# Patient Record
Sex: MALE | Race: WHITE | Employment: UNEMPLOYED | URBAN - METROPOLITAN AREA
[De-identification: names, ages, dates, MRNs, and addresses within clinical notes are randomized per-mention and may not be internally consistent; named-entity substitution may affect disease eponyms.]

---

## 2018-04-29 ENCOUNTER — APPOINTMENT (OUTPATIENT)
Dept: GENERAL RADIOLOGY | Age: 83
End: 2018-04-29
Attending: EMERGENCY MEDICINE
Payer: MEDICARE

## 2018-04-29 ENCOUNTER — APPOINTMENT (OUTPATIENT)
Dept: CT IMAGING | Age: 83
End: 2018-04-29
Attending: INTERNAL MEDICINE
Payer: MEDICARE

## 2018-04-29 ENCOUNTER — HOSPITAL ENCOUNTER (EMERGENCY)
Age: 83
Discharge: HOME | End: 2018-04-29
Attending: EMERGENCY MEDICINE | Admitting: INTERNAL MEDICINE
Payer: MEDICARE

## 2018-04-29 VITALS
SYSTOLIC BLOOD PRESSURE: 149 MMHG | HEIGHT: 62 IN | BODY MASS INDEX: 23 KG/M2 | RESPIRATION RATE: 15 BRPM | WEIGHT: 125 LBS | OXYGEN SATURATION: 100 % | TEMPERATURE: 98.1 F | DIASTOLIC BLOOD PRESSURE: 72 MMHG | HEART RATE: 61 BPM

## 2018-04-29 DIAGNOSIS — T18.108A FOREIGN BODY IN ESOPHAGUS, INITIAL ENCOUNTER: Primary | ICD-10-CM

## 2018-04-29 DIAGNOSIS — C15.5 MALIGNANT NEOPLASM OF LOWER THIRD OF ESOPHAGUS (HCC): ICD-10-CM

## 2018-04-29 LAB
ANION GAP SERPL CALC-SCNC: 7 MMOL/L (ref 3–18)
APTT PPP: 26.9 SEC (ref 23–36.4)
ATRIAL RATE: 62 BPM
BASOPHILS # BLD: 0 K/UL (ref 0–0.06)
BASOPHILS NFR BLD: 0 % (ref 0–2)
BUN SERPL-MCNC: 29 MG/DL (ref 7–18)
BUN/CREAT SERPL: 31 (ref 12–20)
CALCIUM SERPL-MCNC: 8.8 MG/DL (ref 8.5–10.1)
CALCULATED R AXIS, ECG10: 73 DEGREES
CALCULATED T AXIS, ECG11: 73 DEGREES
CHLORIDE SERPL-SCNC: 103 MMOL/L (ref 100–108)
CO2 SERPL-SCNC: 31 MMOL/L (ref 21–32)
CREAT SERPL-MCNC: 0.95 MG/DL (ref 0.6–1.3)
DIAGNOSIS, 93000: NORMAL
DIFFERENTIAL METHOD BLD: ABNORMAL
EOSINOPHIL # BLD: 0.3 K/UL (ref 0–0.4)
EOSINOPHIL NFR BLD: 3 % (ref 0–5)
ERYTHROCYTE [DISTWIDTH] IN BLOOD BY AUTOMATED COUNT: 14.2 % (ref 11.6–14.5)
GLUCOSE BLD STRIP.AUTO-MCNC: 101 MG/DL (ref 70–110)
GLUCOSE SERPL-MCNC: 105 MG/DL (ref 74–99)
HCT VFR BLD AUTO: 40.4 % (ref 36–48)
HGB BLD-MCNC: 13.4 G/DL (ref 13–16)
INR PPP: 1 (ref 0.8–1.2)
LYMPHOCYTES # BLD: 1.4 K/UL (ref 0.9–3.6)
LYMPHOCYTES NFR BLD: 15 % (ref 21–52)
MCH RBC QN AUTO: 31.5 PG (ref 24–34)
MCHC RBC AUTO-ENTMCNC: 33.2 G/DL (ref 31–37)
MCV RBC AUTO: 95.1 FL (ref 74–97)
MONOCYTES # BLD: 1 K/UL (ref 0.05–1.2)
MONOCYTES NFR BLD: 10 % (ref 3–10)
NEUTS SEG # BLD: 7 K/UL (ref 1.8–8)
NEUTS SEG NFR BLD: 72 % (ref 40–73)
PLATELET # BLD AUTO: 250 K/UL (ref 135–420)
PMV BLD AUTO: 9.1 FL (ref 9.2–11.8)
POTASSIUM SERPL-SCNC: 4.2 MMOL/L (ref 3.5–5.5)
PROTHROMBIN TIME: 12.8 SEC (ref 11.5–15.2)
Q-T INTERVAL, ECG07: 418 MS
QRS DURATION, ECG06: 108 MS
QTC CALCULATION (BEZET), ECG08: 424 MS
RBC # BLD AUTO: 4.25 M/UL (ref 4.7–5.5)
SODIUM SERPL-SCNC: 141 MMOL/L (ref 136–145)
VENTRICULAR RATE, ECG03: 62 BPM
WBC # BLD AUTO: 9.7 K/UL (ref 4.6–13.2)

## 2018-04-29 PROCEDURE — 71275 CT ANGIOGRAPHY CHEST: CPT

## 2018-04-29 PROCEDURE — 74011250636 HC RX REV CODE- 250/636

## 2018-04-29 PROCEDURE — 85730 THROMBOPLASTIN TIME PARTIAL: CPT | Performed by: EMERGENCY MEDICINE

## 2018-04-29 PROCEDURE — 77030011640 HC PAD GRND REM COVD -A: Performed by: INTERNAL MEDICINE

## 2018-04-29 PROCEDURE — 74011000250 HC RX REV CODE- 250: Performed by: INTERNAL MEDICINE

## 2018-04-29 PROCEDURE — 85610 PROTHROMBIN TIME: CPT | Performed by: EMERGENCY MEDICINE

## 2018-04-29 PROCEDURE — 99283 EMERGENCY DEPT VISIT LOW MDM: CPT

## 2018-04-29 PROCEDURE — 74011250637 HC RX REV CODE- 250/637

## 2018-04-29 PROCEDURE — 77030007289 HC DEV ROTH NET RETRV STRC -B: Performed by: INTERNAL MEDICINE

## 2018-04-29 PROCEDURE — 77030009426 HC FCPS BIOP ENDOSC BSC -B: Performed by: INTERNAL MEDICINE

## 2018-04-29 PROCEDURE — 71045 X-RAY EXAM CHEST 1 VIEW: CPT

## 2018-04-29 PROCEDURE — G0500 MOD SEDAT ENDO SERVICE >5YRS: HCPCS | Performed by: INTERNAL MEDICINE

## 2018-04-29 PROCEDURE — 76040000003: Performed by: INTERNAL MEDICINE

## 2018-04-29 PROCEDURE — 80048 BASIC METABOLIC PNL TOTAL CA: CPT | Performed by: EMERGENCY MEDICINE

## 2018-04-29 PROCEDURE — 99153 MOD SED SAME PHYS/QHP EA: CPT | Performed by: INTERNAL MEDICINE

## 2018-04-29 PROCEDURE — 96374 THER/PROPH/DIAG INJ IV PUSH: CPT

## 2018-04-29 PROCEDURE — 74011636320 HC RX REV CODE- 636/320: Performed by: EMERGENCY MEDICINE

## 2018-04-29 PROCEDURE — 88305 TISSUE EXAM BY PATHOLOGIST: CPT | Performed by: INTERNAL MEDICINE

## 2018-04-29 PROCEDURE — 85025 COMPLETE CBC W/AUTO DIFF WBC: CPT | Performed by: EMERGENCY MEDICINE

## 2018-04-29 PROCEDURE — 82962 GLUCOSE BLOOD TEST: CPT

## 2018-04-29 PROCEDURE — 93005 ELECTROCARDIOGRAM TRACING: CPT

## 2018-04-29 PROCEDURE — 74011250636 HC RX REV CODE- 250/636: Performed by: INTERNAL MEDICINE

## 2018-04-29 RX ORDER — NALOXONE HYDROCHLORIDE 0.4 MG/ML
0.4 INJECTION, SOLUTION INTRAMUSCULAR; INTRAVENOUS; SUBCUTANEOUS
Status: DISCONTINUED | OUTPATIENT
Start: 2018-04-29 | End: 2018-04-29

## 2018-04-29 RX ORDER — DEXTROMETHORPHAN/PSEUDOEPHED 2.5-7.5/.8
1.2 DROPS ORAL
Status: CANCELLED | OUTPATIENT
Start: 2018-04-29

## 2018-04-29 RX ORDER — FLUMAZENIL 0.1 MG/ML
0.2 INJECTION INTRAVENOUS
Status: CANCELLED | OUTPATIENT
Start: 2018-04-29 | End: 2018-04-29

## 2018-04-29 RX ORDER — MIDAZOLAM HYDROCHLORIDE 1 MG/ML
.5-5 INJECTION, SOLUTION INTRAMUSCULAR; INTRAVENOUS
Status: DISCONTINUED | OUTPATIENT
Start: 2018-04-29 | End: 2018-04-29

## 2018-04-29 RX ORDER — NITROGLYCERIN 0.4 MG/1
TABLET SUBLINGUAL
Status: COMPLETED
Start: 2018-04-29 | End: 2018-04-29

## 2018-04-29 RX ORDER — FLUMAZENIL 0.1 MG/ML
0.2 INJECTION INTRAVENOUS
Status: DISCONTINUED | OUTPATIENT
Start: 2018-04-29 | End: 2018-04-29

## 2018-04-29 RX ORDER — SODIUM CHLORIDE 9 MG/ML
100 INJECTION, SOLUTION INTRAVENOUS CONTINUOUS
Status: CANCELLED | OUTPATIENT
Start: 2018-04-29 | End: 2018-04-29

## 2018-04-29 RX ORDER — NALOXONE HYDROCHLORIDE 0.4 MG/ML
0.4 INJECTION, SOLUTION INTRAMUSCULAR; INTRAVENOUS; SUBCUTANEOUS
Status: CANCELLED | OUTPATIENT
Start: 2018-04-29 | End: 2018-04-29

## 2018-04-29 RX ORDER — EPINEPHRINE 0.1 MG/ML
1 INJECTION INTRACARDIAC; INTRAVENOUS
Status: CANCELLED | OUTPATIENT
Start: 2018-04-29 | End: 2018-04-29

## 2018-04-29 RX ORDER — FENTANYL CITRATE 50 UG/ML
100 INJECTION, SOLUTION INTRAMUSCULAR; INTRAVENOUS
Status: CANCELLED | OUTPATIENT
Start: 2018-04-29 | End: 2018-04-29

## 2018-04-29 RX ORDER — ATROPINE SULFATE 0.1 MG/ML
0.5 INJECTION INTRAVENOUS
Status: CANCELLED | OUTPATIENT
Start: 2018-04-29 | End: 2018-04-29

## 2018-04-29 RX ORDER — EPINEPHRINE 0.1 MG/ML
1 INJECTION INTRACARDIAC; INTRAVENOUS
Status: DISCONTINUED | OUTPATIENT
Start: 2018-04-29 | End: 2018-04-29

## 2018-04-29 RX ORDER — NITROGLYCERIN 0.4 MG/1
0.4 TABLET SUBLINGUAL
Status: COMPLETED | OUTPATIENT
Start: 2018-04-29 | End: 2018-04-29

## 2018-04-29 RX ORDER — FENTANYL CITRATE 50 UG/ML
100 INJECTION, SOLUTION INTRAMUSCULAR; INTRAVENOUS
Status: DISCONTINUED | OUTPATIENT
Start: 2018-04-29 | End: 2018-04-29

## 2018-04-29 RX ORDER — MIDAZOLAM HYDROCHLORIDE 1 MG/ML
.5-5 INJECTION, SOLUTION INTRAMUSCULAR; INTRAVENOUS
Status: CANCELLED | OUTPATIENT
Start: 2018-04-29 | End: 2018-04-29

## 2018-04-29 RX ADMIN — NITROGLYCERIN: 0.4 TABLET SUBLINGUAL at 10:35

## 2018-04-29 RX ADMIN — IOPAMIDOL 100 ML: 755 INJECTION, SOLUTION INTRAVENOUS at 15:36

## 2018-04-29 RX ADMIN — GLUCAGON HYDROCHLORIDE: KIT at 10:35

## 2018-04-29 NOTE — CONSULTS
[id number] [facility]    Ian Marte  MR#: [id number]  : 1930  ACCOUNT #: [de-identified]   DATE OF SERVICE: 2018    HISTORY OF PRESENT ILLNESS:  This is an 68-year-old male who was brought by his family around 10:24 this morning because of acute food impaction that happened during breakfast this morning. He was eating [place] toast and 2 bradley. It happened, in fact, with almost the second bite. Yesterday, he ate hamburger and it went fine. Apparently, the patient has been having problem with dysphagia for the past couple of years but he never had food blockage like this one. He claimed that he had an endoscopy many years ago. He does not remember why and what was found. He denied having any heartburn presently or in the past.  He does not take medication for his stomach. His weight has been stable. He was not afraid to eat. He has been chronically constipated, but he managed to get a bowel movement every day with the help of MiraLax that he was taking as needed. His past medical history is remarkable for mild dementia. He has 2 previous aortic valve replacements that happened about 15 years ago. He had a pacemaker about 7 years ago. Last year, he had a syncopal episode and fell from the roof and a second syncopal episode happened this year in 2018 where apparently he tripped on something on the floor but we are not sure about the circumstances. This patient had hip surgery but no abdominal surgery. This patient has been living by himself in [place]. He was supposed to see his cardiologist 3 weeks ago and that appointment was canceled because of the weather issue. FAMILY HISTORY:  Mother  from complication of a fall and father had Crohn disease but there is no history of cancer in the family. ALLERGIES:  HE IS ALLERGIC TO LATEX. MEDICATIONS AT HOME:  Eliquis, pravastatin, lisinopril, terazosin.     FUNCTIONAL INQUIRY:  He denies having chest pain, shortness of breath, dizziness, lightheadedness, recent falling, just had the one in February. No history of stroke, paralysis, numbness. No fever, chills, shivers. His weight has been stable. According to the family, the patient has been having problem with memories and he has mild dementia but he is quite functional and active at home. PHYSICAL EXAMINATION:  GENERAL:  We have an 26-year-old  male who appears to be much younger than his true age and in no distress. He weighs 125 pounds and measures 5 feet 2 inches. VITAL SIGNS:  Temperature is 98.1, pulse 61, blood pressure 157/69, breathing 22, saturation 100% on room air. SKIN:  Normal.  No evidence of any rash, no ecchymosis. HEENT:  Eyes are unremarkable. The pupils are equal and reactive to light. Sclerae are anicteric. The conjunctivae are pink. Oropharyngeal cavity is normal with moist and pink mucous membrane, normal teeth. NECK:  Supple, no palpable mass, no enlarged thyroid. He has a midsternal surgical scar and a pacemaker in the left upper chest.  LUNGS:  Clear to auscultation. HEART:  Cardiac rhythm is regular. He is in paced rhythm. The S1 and S2 are normal and there is no murmur. ABDOMEN:  Nondistended, very soft, nontender, no mass or organomegaly. Bowel sounds are normal.  EXTREMITIES:  Unremarkable. No pedal edema, no clubbing, no tremor, no focal neurological sign. He is alert and oriented. Moves all his 4 extremities. SOCIAL HISTORY:  I forgot to mention that this patient used to smoke many years ago cigars. He has been drinking 2-4 alcoholic drinks daily, but not recently. LABORATORY DATA:  CBC normal with hemoglobin of 13.4, normal platelets, WBC differential, normal coagulation. Basic metabolic panel is normal with a BUN of 29, creatinine 0.935. IMAGING:  Chest x-ray status post CABG and presence of a pacemaker. Lungs are clear. Mild cardiomegaly.     CONCLUSION:  In conclusion, this is an 80-year-old male who has been having problem with progressive intermittent dysphagia to solids for the past couple of years, presents with acute food bolus impaction for the first time. This started early in the morning at breakfast and he has been unable to clear it, even after giving him glucagon. He is still spitting saliva, but not choking. 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 At this age, it could be malignant. We need to check his esophagus to remove the foreign body and then inspect and dilate his esophagus if needed. We may have to limit the amount of biopsies in case he has some malignant pathology there, just because he is on Eliquis. He may require repeat procedure. This patient has a previous aortic valve replacement with a bovine valve about 15 years ago. He appears to be doing very well. He has also a pacemaker about 7 years ago, still functioning. He appears to be completely dependent of the pace. I told him that he needs to check with his cardiologist, especially that he had a fall a year ago in February 2018. He appears to have mild dementia. He is living by himself and his family is planning on bringing him with them in this area. Further note to follow. SHENA Reina MD MBE / Jody Jose  D: 04/29/2018 12:47     T: 04/29/2018 14:36  JOB #: [id number]  CC: Lashanda Tijerina MD

## 2018-04-29 NOTE — ED TRIAGE NOTES
States he feels like he has a stricture in his esophagus. Family member states pt ate [place] toast and bradley this morning when sx's began. Pt noted to be spitting into bag on arrival to ED. Family member states pt recently came to stay with them--no longer to stay by himself due to dementia--and has had episodes of coughing and feeling like something is in his throat after eating. Sepsis Screening completed    (  )Patient meets SIRS criteria. (x  )Patient does not meet SIRS criteria.       SIRS Criteria is achieved when two or more of the following are present   Temperature < 96.8°F (36°C) or > 100.9°F (38.3°C)   Heart Rate > 90 beats per minute   Respiratory Rate > 20 breaths per minute   WBC count > 12,000 or <4,000 or > 10% bands

## 2018-04-29 NOTE — PROCEDURES
[facility]  Esophagogastroduodenoscopy Procedure Report  _______________________________________________________  Patient: Nithya Trevino                                         Attending Physician: Mario Ramirez MD    Patient ID: [id number]                                      Referring Physician: PROVIDER UNKNOWN    Exam Date: April 29, 2018 _______________________________________________________      Introduction: A  80 y.o. male patient, presents for Esophagogastroduodenoscopy Procedure. Indication: First episode of acute food impaction. Has been c/o intermittent dysphagia to solids for 2 years. No weight loss or GERD. He is on Eliquis for biologic aortic valve replacement 15 years. : Mario Ramirez MD    Sedation:    Versed 10 mg IV, fentanyl 100 mcg IV, topical Hurricaine spray, glucagon 1 mg IV. Procedure Details:  After infomed consent was obtained for the procedure, with all risks and benefits of procedure explained the patient was taken to the endoscopy suite and placed in the left lateral decubitus position. Following sequential administration of sedation as per above, the endoscope was inserted into the mouth and advanced under direct vision to second portion of the duodenum. A careful inspection was made as the gastroscope was withdrawn, including a retroflexed view of the proximal stomach; findings and interventions are described below. Findings:   HYPOPHARYNX AND LARYNX: Normal  Esophagus: Esophagus spastic tortuous full of food going all the way to the proximal esophagus. It was removed in piece meal fashion with Olivia Jesus net in multiple trips to the out side. There is a 8 cm long mid esophageal infiltrative, stenotic  and friable cancer between 26 and 34 cm occupying 80 % of the circumference. Biopsies taken. The Z line is located at 38 cm there is < 2 cm hiatal hernia. Normal proximal and distal esophagus. Diaphragmatic pinch located at 40 cm.     Stomach: No food or liquid retention. Normal, cardia, fundus, body, lesser curvature, incisura, antrum and pylorus. Mild diffuse gastritis. .    Duodenum/jejunum: The bulb, second portions and major papilla are normal     Therapies:    biopsy of esophagus  removal of foriegn body food particles impacted in the esophagus using rosenberg net in at least 10 trips    Specimen:   One           Complications:   None    EBL:  Minimal  IMPRESSION:  Esophagus spastic tortuous full of food going all the way to the proximal esophagus. It was removed with Brittany Neighbor net in multiple trips to the out side. There is a 8 cm long mid esophageal infiltrative, stenotic  and friable cancer between 26 and 34 cm occupying 80 % of the circumference. Biopsies taken. The Z line is located at 38 cm there is < 2 cm hiatal hernia. Mild diffuse gastritis. Recommendations: -Await pathology. , -soft to liquid diet, - would need consult with oncology and radiooncology. Do a CT scan of the chest and abdomen for staging.   Assistant: none    Caron Thompson MD  4/29/2018  12:52 PM

## 2018-04-29 NOTE — PERIOP NOTES
PT S/P EGD. VSS. PT RECEIVED 10 VERSED  FENTANYL UNDER CONSCIOUS SEDATION. PT TOLERATED PROCEDURE WELL. CALLED REPORT TO CHARGE RN AND DID BEDSIDE REPORT WITH STAFF RN. REPORTED FINDINGS, HIATAL HERNIA, LARGE MID ESOPHOGEAL CANCEROUS TUMOR FOUND , FOOD IMPACTION REMOVAL COMPLETED AS WELL. FAMILY AT BEDSIDE AND SPOKEN TO BY PHYSICIAN AND FINDINGS DISCUSSED. CT ORDERED AND ER WILL COMPLETE.

## 2018-04-29 NOTE — ED PROVIDER NOTES
EMERGENCY DEPARTMENT HISTORY AND PHYSICAL EXAM    Date: 4/29/2018  Patient Name: Christopher Lua    History of Presenting Illness     Chief Complaint   Patient presents with    Other         History Provided By: Patient    Chief Complaint: sensation of foreign body stuck in throat  Duration: this morning   Timing:  Acute  Location: \"esophagus down to my stomach\"  Quality: \"It feels like a stricture\"  Severity: 5 out of 10  Modifying Factors: Pt spitting in a bag as he is unable to swallow (trying to swallow worsens it). Associated Symptoms: difficulty swallowing, mild chest pain, mild SOB    Additional History (Context):   10:20 AM  Christopher Lua is a 80 y.o. male with PMHX of HLD, HTN, dementia, and Atrial flutter who presents to the emergency department C/O sensation of foreign body stuck in throat \"esophagus down to my stomach; it feels like a stricture\" (points from throat to mid chest) after eating bradley and [place] toast this morning. Pt spitting in a back as he is unable to swallow. Associated sxs include difficulty swallowing, mild CP, and mild SOB. Allergy reported to Latex. Pt denies hx of this occurring in the past or GERD. PSHx of pacemaker placement and valve placement. Pt denies any other sxs or complaints. PCP: PROVIDER UNKNOWN        Past History     Past Medical History:  Past Medical History:   Diagnosis Date    Atrial flutter ([address]     Dementia     Fall     Hypertension     Risk for falls        Past Surgical History:  Past Surgical History:   Procedure Laterality Date    CARDIAC SURG PROCEDURE UNLIST      valve replacement    HX PACEMAKER         Family History:  No family history on file. Social History:  Social History   Substance Use Topics    Smoking status: Not on file    Smokeless tobacco: Not on file    Alcohol use Not on file       Allergies: Allergies   Allergen Reactions    Latex Rash         Review of Systems   Review of Systems   HENT: Positive for trouble swallowing. (+) Foreign body stuck in throat sensation   Respiratory: Positive for shortness of breath (mild). Cardiovascular: Positive for chest pain (mild). All other systems reviewed and are negative. Physical Exam     Vitals:    04/29/18 1410 04/29/18 1415 04/29/18 1500 04/29/18 1700   BP: 105/57 120/60 122/57 149/72   Pulse: 86 64 60 61   Resp: 19 18 15 15   Temp:       SpO2: 98% 94% 100% 100%   Weight:       Height:         Physical Exam   Nursing note and vitals reviewed. Vital signs and nursing notes reviewed    CONSTITUTIONAL: Alert, not tolerating oral secretions. Coughing up spit and gurgly. ; well-developed; well-nourished. HEAD:  Normocephalic, atraumatic  EYES: PERRL; EOM's intact. ENTM: Nose: no rhinorrhea; Throat: no erythema or exudate, mucous membranes moist  Neck:  No JVD, supple without lymphadenopathy  RESP: Chest clear but with respiratory gurgles transmitting sound, equal breath sounds. CV: S1 and S2 WNL; No murmurs, gallops or rubs. GI: Normal bowel sounds, abdomen soft and non-tender. No masses or organomegaly. : No costo-vertebral angle tenderness. BACK:  Non-tender  UPPER EXT:  Normal inspection  LOWER EXT: No edema, no calf tenderness. Distal pulses intact. NEURO: CN intact, reflexes 2/4 and sym, strength 5/5 and sym, sensation intact. SKIN: No rashes; Normal for age and stage. PSYCH:  Alert and oriented, normal affect.          Diagnostic Study Results     Labs -     Recent Results (from the past 12 hour(s))   CBC WITH AUTOMATED DIFF    Collection Time: 04/29/18 10:30 AM   Result Value Ref Range    WBC 9.7 4.6 - 13.2 K/uL    RBC 4.25 (L) 4.70 - 5.50 M/uL    HGB 13.4 13.0 - 16.0 g/dL    HCT 40.4 36.0 - 48.0 %    MCV 95.1 74.0 - 97.0 FL    MCH 31.5 24.0 - 34.0 PG    MCHC 33.2 31.0 - 37.0 g/dL    RDW 14.2 11.6 - 14.5 %    PLATELET 255 899 - 482 K/uL    MPV 9.1 (L) 9.2 - 11.8 FL    NEUTROPHILS 72 40 - 73 %    LYMPHOCYTES 15 (L) 21 - 52 %    MONOCYTES 10 3 - 10 % EOSINOPHILS 3 0 - 5 %    BASOPHILS 0 0 - 2 %    ABS. NEUTROPHILS 7.0 1.8 - 8.0 K/UL    ABS. LYMPHOCYTES 1.4 0.9 - 3.6 K/UL    ABS. MONOCYTES 1.0 0.05 - 1.2 K/UL    ABS. EOSINOPHILS 0.3 0.0 - 0.4 K/UL    ABS. BASOPHILS 0.0 0.0 - 0.06 K/UL    DF AUTOMATED     METABOLIC PANEL, BASIC    Collection Time: 04/29/18 10:30 AM   Result Value Ref Range    Sodium 141 136 - 145 mmol/L    Potassium 4.2 3.5 - 5.5 mmol/L    Chloride 103 100 - 108 mmol/L    CO2 31 21 - 32 mmol/L    Anion gap 7 3.0 - 18 mmol/L    Glucose 105 (H) 74 - 99 mg/dL    BUN 29 (H) 7.0 - 18 MG/DL    Creatinine 0.95 0.6 - 1.3 MG/DL    BUN/Creatinine ratio 31 (H) 12 - 20      GFR est AA >60 >60 ml/min/1.73m2    GFR est non-AA >60 >60 ml/min/1.73m2    Calcium 8.8 8.5 - 10.1 MG/DL   PROTHROMBIN TIME + INR    Collection Time: 04/29/18 10:30 AM   Result Value Ref Range    Prothrombin time 12.8 11.5 - 15.2 sec    INR 1.0 0.8 - 1.2     PTT    Collection Time: 04/29/18 10:30 AM   Result Value Ref Range    aPTT 26.9 23.0 - 36.4 SEC   GLUCOSE, POC    Collection Time: 04/29/18 10:31 AM   Result Value Ref Range    Glucose (POC) 101 70 - 110 mg/dL   EKG, 12 LEAD, INITIAL    Collection Time: 04/29/18 10:32 AM   Result Value Ref Range    Ventricular Rate 62 BPM    Atrial Rate 62 BPM    QRS Duration 108 ms    Q-T Interval 418 ms    QTC Calculation (Bezet) 424 ms    Calculated R Axis 73 degrees    Calculated T Axis 73 degrees    Diagnosis       Junctional rhythm  Abnormal ECG  No previous ECGs available         Radiologic Studies -     CT Results  (Last 48 hours)               04/29/18 1548  CTA CHEST ABD PELV W CONT Final result    Impression:  IMPRESSION:           1. Atherosclerosis of the aorta and its branches, including the coronary   arteries, without evidence of dissection. 2. Ectasia of the infrarenal abdominal aorta measuring 2.6 cm in AP diameter. 3. Esophageal thickening corresponding to the known mass.  There are enlarged   upper mediastinal and upper abdominal lymph nodes, most likely representing   sites of metastatic lymphadenopathy. 4. Additional chronic and incidental findings as above. Narrative:   CTA OF THE CHEST, ABDOMEN, AND PELVIS, WITH CONTRAST       INDICATION: Mid esophageal cancer extending over 8 cm between 26 and 34 cm       COMPARISON: Chest radiograph from earlier today. Technique: Axial imaging was obtained dynamically through the thoracic and   abdominal aorta using high-resolution CT angiographic protocol, without   complications. In addition, coronal and sagittal reconstructed MIP arteriograms   were performed, to better evaluate the thoracic and abdominal aorta, and to   increase sensitivity for detection of aortic pathology. 100 mL Isovue-370   non-ionic intravenous contrast agent used for this exam.       One or more dose reduction techniques were used on this CT: automated exposure   control, adjustment of the mAs and/or kVp according to patient's size, and   iterative reconstruction techniques. The specific techniques utilized on this CT   exam have been documented in the patient's electronic medical record.       ============       FINDINGS:       CT  AORTOGRAM: There is severe atherosclerosis of the aorta and its branches. There is ectasia of the infrarenal abdominal aorta measuring up to 2.6 cm in   diameter. No evidence of dissection, penetrating atherosclerotic ulcer, or   intramural hematoma. Exam was not timed to evaluate the pulmonary arteries,   however no evidence of large central pulmonary aneurysm. LUNGS: There is dependent bilateral airspace opacity. PLEURA: Normal, with no effusion or pneumothorax. AIRWAY: Unremarkable. MEDIASTINUM: Heart size is enlarged. There is a high right paratracheal   centrally necrotic mass measuring 3.5 x 3 cm. There is thickening of the distal   esophagus. LIVER/BILIARY: No suspicious liver lesion. No biliary dilation. Gallbladder   unremarkable. SPLEEN: Normal.       PANCREAS: Normal.       ADRENALS: Normal.       KIDNEYS: Simple cyst within the interpolar region of the left. No hydronephrosis   or nephrolithiasis. LYMPH NODES: There is a 1.8 cm short axis gastrohepatic ligament lymph node. GI TRACT: No wall thickening or dilation. Colonic diverticulosis without   evidence of acute diverticulitis. VASCULATURE: Thoracic and abdominal aorta as described previously. PELVIC ORGANS: Unremarkable. BONES: No acute osseous abnormality. Status post ORIF of right proximal femur   fracture. Healing right inferior and superior pubic rami fractures. Probable   posttraumatic deformity of the right posterior iliac bone. Multilevel   degenerative changes of the spine. No suspicious lytic or blastic osseous   lesion. Bilateral, left greater than right shoulder osteoarthritis. OTHER: No ascites or free intraperitoneal air.       ============             As read by the radiologist.    CXR Results  (Last 48 hours)               04/29/18 1047  XR CHEST PORT Final result    Impression:  IMPRESSION:        No acute cardiopulmonary disease. Narrative:  EXAM: Chest x-ray single AP view       INDICATION: Patient feels like he had a stricture in the esophagus. Esophageal   foreign body. Dementia. COMPARISON: None.       _____________       FINDINGS:        Left transvenous approach cardiac device is present. Status post CABG. No   radiopaque foreign body. The lungs are clear. Mild cardiomegaly. No pleural effusion. No acute osseus   abnormality.        _____________             As read by the radiologist.      Medications given in the ED-  Medications   glucagon (GLUCAGEN) injection (0.5 mg IntraVENous Given 4/29/18 1315)   glucagon (GLUCAGEN) injection 1 mg ( IntraVENous Given 4/29/18 1035)   nitroglycerin (NITROSTAT) tablet 0.4 mg ( SubLINGual Given 4/29/18 1035)   benzocaine (HURRICANE) 20 % spray (2 Sprays Mucous Membrane Given 4/29/18 1252)   iopamidol (ISOVUE-370) 76 % injection  mL (100 mL IntraVENous Given 4/29/18 1536)         Medical Decision Making   I am the first provider for this patient. I reviewed the vital signs, available nursing notes, past medical history, past surgical history, family history and social history. Vital Signs-Reviewed the patient's vital signs. Pulse Oximetry Analysis - 100% on room air. Cardiac Monitor:  Rate: 62 bpm  Rhythm: paced    EKG interpretation: (Preliminary)  10:20 AM   Junctional rhythm at 62 bpm. Paced Rhythm. EKG read by Es Calloway MD     Records Reviewed: Nursing Notes    Provider Notes (Medical Decision Making): DDx: Esophageal foreign body, cardiomegaly, esophageal stricture, eosinophilic esophagitis. Procedures:  Procedures    ED Course:   10:20 AM Initial assessment performed. The patients presenting problems have been discussed, and they are in agreement with the care plan formulated and outlined with them. I have encouraged them to ask questions as they arise throughout their visit. 10:40 AM Discussed patient's history, exam, and available diagnostics results with Caron Thompson MD, gastroenterology, who will get his team in to come to the ER and see pt.    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 He ordered a CT Chest Abdomen Pelvis for staging, recommends having pt f/u with oncology or radiation oncology for f/u. States that pt required 10 mg Versed for procedure to sedate. 4:56 PM Discussed patient's history, exam, and available diagnostics results with Leigha Samson MD, oncology, who states that given the CT scan and age of pt with lymph node findings, this is stage 4 cancer, surgery would not be recommended for pt. They would be happy to see pt to provide palliative tx.     Diagnosis and Disposition       10:32 AM  I have spent 78 minutes of critical care time involved in lab review, consultations with specialist, family decision-making, and documentation. During this entire length of time I was immediately available to the patient. Critical Care: The reason for providing this level of medical care for this critically ill patient was due a critical illness that impaired one or more vital organ systems such that there was a high probability of imminent or life threatening deterioration in the patients condition. This care involved high complexity decision making to assess, manipulate, and support vital system functions, to treat this degreee vital organ system failure and to prevent further life threatening deterioration of the patients condition. DISCHARGE NOTE:  4:58 PM  Lui Coffey  results have been reviewed with him. He has been counseled regarding his diagnosis, treatment, and plan. He verbally conveys understanding and agreement of the signs, symptoms, diagnosis, treatment and prognosis and additionally agrees to follow up as discussed. He also agrees with the care-plan and conveys that all of his questions have been answered. I have also provided discharge instructions for him that include: educational information regarding their diagnosis and treatment, and list of reasons why they would want to return to the ED prior to their follow-up appointment, should his condition change. He has been provided with education for proper emergency department utilization. CLINICAL IMPRESSION:    1. Foreign body in esophagus, initial encounter    2. Malignant neoplasm of lower third of esophagus (HCC)        PLAN:  1. D/C Home  2. Current Discharge Medication List      CONTINUE these medications which have NOT CHANGED    Details   apixaban (ELIQUIS PO) Take  by mouth.      pravastatin sodium (PRAVASTATIN PO) Take  by mouth. LISINOPRIL PO Take  by mouth. terazosin HCl (TERAZOSIN PO) Take  by mouth.            3.   Follow-up Information     Follow up With Details Comments Contact Ellyn Brock Schedule an appointment as soon as possible for a visit For Oncology Follow Up [address], [address]    [facility] EMERGENCY DEPT Go to If symptoms worsen, As needed [address] [id number]  [phone]        _______________________________    Attestations: This note is prepared by Mayra Osler, acting as Scribe for Meryle Repress, MD.    Meryle Repress, MD:  The scribe's documentation has been prepared under my direction and personally reviewed by me in its entirety.   I confirm that the note above accurately reflects all work, treatment, procedures, and medical decision making performed by me.  _______________________________

## 2018-08-21 ENCOUNTER — HOSPITAL ENCOUNTER (OUTPATIENT)
Dept: CT IMAGING | Age: 83
Discharge: HOME | End: 2018-08-21
Attending: INTERNAL MEDICINE
Payer: MEDICARE

## 2018-08-21 DIAGNOSIS — C15.4 MALIGNANT NEOPLASM OF THORACIC ESOPHAGUS (HCC): ICD-10-CM

## 2018-08-21 LAB — CREAT UR-MCNC: 0.8 MG/DL (ref 0.6–1.3)

## 2018-08-21 PROCEDURE — 74011636320 HC RX REV CODE- 636/320: Performed by: INTERNAL MEDICINE

## 2018-08-21 PROCEDURE — 74177 CT ABD & PELVIS W/CONTRAST: CPT

## 2018-08-21 PROCEDURE — 82565 ASSAY OF CREATININE: CPT

## 2018-08-21 RX ADMIN — IOPAMIDOL 100 ML: 612 INJECTION, SOLUTION INTRAVENOUS at 16:47

## 2024-06-03 NOTE — ED NOTES
Group Topic: Feeling Awareness/Expression   Group Date: 6/3/2024  Start Time: 11:00 AM  End Time: 12:00 PM  Facilitators: SRI Dudley   Department: [facility]    This was a video IOP group on a HIPAA compliant platform. All patients were provided with informed consent.    Date: 6/3/24  Time: 11:00am-12:00 pm group  Number of Patients Present: 10   User Name: SRI Chavez, ATR  Number of Staff: 2    Topics: Personal Strengths through the use of art-based media     Pt. was introduced to an art-based therapeutic intervention on personal strengths, where they were directed to trace their hands on a blank piece of paper and fill them with words/images/thoughts that represented their achievements and strengths. Next, pt. independently answered the following questions:    What are your strengths?  What areas are you weaker in?  How do your strengths help you? (Think about: in your profession, relationships, hobbies, etc.)  Where do you plan to keep this?  Lastly, pt. shared and processed with the group. Pt. was present, attentive, and fully engaged in group discussion.     Facilitator: SRI Avendano, ATR  Secondary Facilitator: Janessa Pedro, CT  Supervised by SRI Larry-S, ATR    Name: Jesus Castañeda YOB: 1992   MR: [id number]           Pt returned from GI after food bolus. Pt given Versed 10mg and Fentanyl 100 mcg IVP for procedure. Pt with large esophageal tumor and biopsy sent per Dr Juan Sagastume. Pt care assumed from GI RN at this time.

## (undated) DEVICE — MOUTHPIECE ENDOSCP 20X27MM --

## (undated) DEVICE — TRAP SPEC COLL POLYP POLYSTYR --

## (undated) DEVICE — SPONGE GZ W4XL4IN COT 12 PLY TYP VII WVN C FLD DSGN

## (undated) DEVICE — REM POLYHESIVE ADULT PATIENT RETURN ELECTRODE: Brand: VALLEYLAB

## (undated) DEVICE — KENDALL RADIOLUCENT FOAM MONITORING ELECTRODE RECTANGULAR SHAPE: Brand: KENDALL

## (undated) DEVICE — ENDO CARRY-ON PROCEDURE KIT INCLUDES ENZYMATIC SPONGE, GAUZE, BIOHAZARD LABEL, TRAY, LUBRICANT, DIRTY SCOPE LABEL, WATER LABEL, TRAY, DRAWSTRING PAD, AND DEFENDO 4-PIECE KIT.: Brand: ENDO CARRY-ON PROCEDURE KIT

## (undated) DEVICE — NET RETRV FB ENDOSCP 2.5MMX230 -- ROTH NET

## (undated) DEVICE — MAJ-1414 SINGLE USE ADPATER BIOPSY VALV: Brand: SINGLE USE ADAPTOR BIOPSY VALVE

## (undated) DEVICE — SINGLE PORT MANIFOLD: Brand: NEPTUNE 2

## (undated) DEVICE — FORCEPS BX CAP 240CM L RAD JAW 4

## (undated) DEVICE — MEDI-VAC NON-CONDUCTIVE SUCTION TUBING: Brand: CARDINAL HEALTH